# Patient Record
(demographics unavailable — no encounter records)

---

## 2024-12-11 NOTE — REVIEW OF SYSTEMS
[IPSS Score (0-40): ___] : IPSS score: [unfilled] [EPIC-CP Score (0-60): ___] : EPIC-CP score: [unfilled] [Urinary Urgency: Grade 2 - Limiting instrumental ADL; medical management indicated] : Urinary Urgency: Grade 2 - Limiting instrumental ADL; medical management indicated [Urinary Frequency: Grade 2 - Limiting instrumental ADL; medical management indicated] : Urinary Frequency: Grade 2 - Limiting instrumental ADL; medical management indicated [Erectile Dysfunction: Grade 2 - Decrease in erectile function (frequency/rigidity of erections), erectile intervention indicated, (e.g., medication or mechanical devices such as penile pump)] : Erectile Dysfunction: Grade 2 - Decrease in erectile function (frequency/rigidity of erections), erectile intervention indicated, (e.g., medication or mechanical devices such as penile pump) [Ejaculation Disorder: Grade 2 - Anejaculation or retrograde ejaculation] : Ejaculation Disorder: Grade 2 - Anejaculation or retrograde ejaculation

## 2024-12-12 NOTE — HISTORY OF PRESENT ILLNESS
[FreeTextEntry1] :   Merlyn Agrawal  is 65-year-old male with no family history of Prostate ca .He   8/2023 underwent Prostate Bx that revealed 10 out of 13 cores are positive, 10%-90% tissue involvement [place] Score 9 (4+5), a diagnosis of risk Prostatic Adenocarcinoma, He underwent on 12/18/2023 JAVAN procedure with pelvic lymph node dissection. pT3bN0 with high grade intraepithelial neoplasia  Urologist: Dr. Duran  PSA Trend: ng/mL 8/20/2024:   1.80ng/mL 5/7/2024:     0.13ng/mL 2/6/2024:     <0.01ng/mL 11/8/2023:    10.30ng/mL 7/11/2023:      8.01ng/mL  PATHOLOGY: 12/18/2023  pT3b ,  pN0 High grade prostatic intraepithelial  PET CT PSMA  11/21/2024  IMPRESSION:  Since PET/CT 9/15/2023, new 18F-DCFPyL avid sclerotic lesion in the right ischium suspicious for osseous metastasis.  Patient presents today for consideration of radiation therapy options to the prostate, referred by Dr. Duran. Overall, the patient states he feels well and denies any radiation therapy in the past. He notes baseline urine function with and nocturia x2-3 not using Flomax 0.4mg at night.  He does experience urinary urgency and frequency. He continues to have dribbling wears a pad and changes it three time daily.  Denies dysuria or hematuria. He has well-formed bowel movements. He denies blood or mucous in the stool. He denies rectal pain and states no history of hemorrhoids. He had his last colonoscopy was this past May 2024 with negative results. He has not received any form of ADT therapy in the past. He is not sexually active and on no ED medications.

## 2024-12-12 NOTE — HISTORY OF PRESENT ILLNESS
[FreeTextEntry1] :   Merlyn Agrawal  is 65-year-old male with no family history of Prostate ca .He   8/2023 underwent Prostate Bx that revealed 10 out of 13 cores are positive, 10%-90% tissue involvement [place] Score 9 (4+5), a diagnosis of risk Prostatic Adenocarcinoma, He underwent on 12/18/2023 JAVAN procedure with pelvic lymph node dissection. pT3bN0 with high grade intraepithelial neoplasia  Urologist: Dr. Duran  PSA Trend: ng/mL 8/20/2024:   1.80ng/mL 5/7/2024:     0.13ng/mL 2/6/2024:     <0.01ng/mL 11/8/2023:    10.30ng/mL 7/11/2023:      8.01ng/mL  PATHOLOGY: 12/18/2023  pT3b ,  pN0 High grade prostatic intraepithelial  PET CT PSMA  11/21/2024  IMPRESSION:  Since PET/CT 9/15/2023, new 18F-DCFPyL avid sclerotic lesion in the right ischium suspicious for osseous metastasis.  Patient presents today for consideration of radiation therapy options to the prostate, referred by Dr. Duran. Overall, the patient states he feels well and denies any radiation therapy in the past. He notes baseline urine function with and nocturia x2-3 not using Flomax 0.4mg at night.  He does experience urinary urgency and frequency. He continues to have dribbling wears a pad and changes it three time daily.  Denies dysuria or hematuria. He has well-formed bowel movements. He denies blood or mucous in the stool. He denies rectal pain and states no history of hemorrhoids. He had his last colonoscopy was this past May 2024 with negative results. He has not received any form of ADT therapy in the past. He is not sexually active and on no ED medications.

## 2024-12-12 NOTE — HISTORY OF PRESENT ILLNESS
[FreeTextEntry1] :   Merlyn Agrawal  is 65-year-old male with no family history of Prostate ca .He   8/2023 underwent Prostate Bx that revealed 10 out of 13 cores are positive, 10%-90% tissue involvement [place] Score 9 (4+5), a diagnosis of risk Prostatic Adenocarcinoma, He underwent on 12/18/2023 JAVAN procedure with pelvic lymph node dissection. pT3bN0 with high grade intraepithelial neoplasia  Urologist: Dr. Duran  PSA Trend: ng/mL 8/20/2024:   1.80ng/mL 5/7/2024:     0.13ng/mL 2/6/2024:     <0.01ng/mL 11/8/2023:    10.30ng/mL 7/11/2023:      8.01ng/mL  PATHOLOGY: 12/18/2023  pT3b ,  pN0 High grade prostatic intraepithelial  PET CT PSMA  11/21/2024  IMPRESSION:  Since PET/CT 9/15/2023, new 18F-DCFPyL avid sclerotic lesion in the right ischium suspicious for osseous metastasis.  Patient presents today for consideration of radiation therapy options to the prostate, referred by Dr. Duran. Overall, the patient states he feels well and denies any radiation therapy in the past. He notes baseline urine function with and nocturia x2-3 not using Flomax 0.4mg at night.  He does experience urinary urgency and frequency. He continues to have dribbling wears a pad and changes it three time daily.  Denies dysuria or hematuria. He has well-formed bowel movements. He denies blood or mucous in the stool. He denies rectal pain and states no history of hemorrhoids. He had his last colonoscopy was this past May 2024 with negative results. He has not received any form of ADT therapy in the past. He is not sexually active and on no ED medications.

## 2025-01-02 NOTE — DISEASE MANAGEMENT
[X] : MX [IV] : IV [Clinical] : TNM Stage: c [TTNM] : x [NTNM] : x [MTNM] : x [de-identified] : 2000cGy [de-identified] : 3000cGy [de-identified] : Prostate

## 2025-01-02 NOTE — HISTORY OF PRESENT ILLNESS
[FreeTextEntry1] :   Mr. BlackwellSien  is 65-year-old male with no family history of Prostate ca .He   8/2023 underwent Prostate Bx that revealed 10 out of 13 cores are positive, 10%-90% tissue involvement [place] Score 9 (4+5), a diagnosis of risk Prostatic Adenocarcinoma, He underwent on 12/18/2023 JAVAN procedure with pelvic lymph node dissection. pT3bN0 with high grade intraepithelial neoplasia. Following PSA which has been increasing . PET Scan revealed a new right ischium lesin and is here for radiation therpay  Urologist: Dr. Duran  PSA Trend: ng/mL 8/20/2024:   1.80ng/mL 5/7/2024:     0.13ng/mL 2/6/2024:     <0.01ng/mL 11/8/2023:    10.30ng/mL 7/11/2023:      8.01ng/mL  PATHOLOGY: 12/18/2023  pT3b ,  pN0 High grade prostatic intraepithelial  PET CT PSMA  11/21/2024  IMPRESSION:  Since PET/CT 9/15/2023, new 18F-DCFPyL avid sclerotic lesion in the right ischium suspicious for osseous metastasis.   OTV APPTS 12/30/2024: Mr. Blackwell has completed 2/3 Fx or 2000/3000cGy radiaiton to the right ischium. He denies any pain or discomfort. He has experiencing increase phlegm and fatigue and relating it to Casodex. Re enforced that he needs to continue to take the Casodex as prescribed. He verbalized his understanding via Cantonese  ID # [id number]

## 2025-01-02 NOTE — HISTORY OF PRESENT ILLNESS
[FreeTextEntry1] :   Mr. BlackwellSien  is 65-year-old male with no family history of Prostate ca .He   8/2023 underwent Prostate Bx that revealed 10 out of 13 cores are positive, 10%-90% tissue involvement [place] Score 9 (4+5), a diagnosis of risk Prostatic Adenocarcinoma, He underwent on 12/18/2023 JAVAN procedure with pelvic lymph node dissection. pT3bN0 with high grade intraepithelial neoplasia. Following PSA which has been increasing . PET Scan revealed a new right ischium lesin and is here for radiation therpay  Urologist: Dr. Duran  PSA Trend: ng/mL 8/20/2024:   1.80ng/mL 5/7/2024:     0.13ng/mL 2/6/2024:     <0.01ng/mL 11/8/2023:    10.30ng/mL 7/11/2023:      8.01ng/mL  PATHOLOGY: 12/18/2023  pT3b ,  pN0 High grade prostatic intraepithelial  PET CT PSMA  11/21/2024  IMPRESSION:  Since PET/CT 9/15/2023, new 18F-DCFPyL avid sclerotic lesion in the right ischium suspicious for osseous metastasis.   OTV APPTS 12/30/2024: Mr. Blackwell has completed 2/3 Fx or 2000/3000cGy radiaiton to the right ischium. He denies any pain or discomfort. He has experiencing increase phlegm and fatigue and relating it to Casodex. Re enforced that he needs to continue to take the Casodex as prescribed. He verbalized his understanding via Cantonese  ID # [id number]

## 2025-01-02 NOTE — DISEASE MANAGEMENT
[X] : MX [IV] : IV [Clinical] : TNM Stage: c [NTNM] : x [TTNM] : x [MTNM] : x [de-identified] : 2000cGy [de-identified] : 3000cGy [de-identified] : Prostate

## 2025-01-07 NOTE — LETTER BODY
[FreeTextEntry1] : June Sánchez MD  [address],  [place] [id number] [phone]    Dear Dr. Sánchez,    REASON FOR VISIT: Prostate cancer.  BPH  This is a 65 year -old gentleman with prostate cancer and BPH s/p prostate biopsy. His PET CT scan was negative. Prostate biopsy demonstrated evidence of [place] 9, 8, and 6 adenocarcinoma of the prostate involving 12 of 15 cores. The patient had JAVAN with Dr. Duran, with rising PSA after surgery. Patient then finished radiation therapy in December 2024. His current PSA is 1.8. The patient returns for Eligard injection. Since his last visit, he has been taking Bicalutamide for 2 weeks. He denies any interval complaints or difficulties. Patient reports no pain. He has been doing well. Patient denies any changes in health. The past medical history and family history and social history are unchanged. All other review of systems are negative. Patient denies any changes in medications. Medication list was reconciled.    He is currently taking calcium supplements and vitamin D for osteoporosis.     On examination, the patient is a healthy-appearing gentleman in no acute distress. He is alert and oriented follows commands. He has normal mood and affect. He is normocephalic. Oral no thrush. Neck is supple. Respirations are unlabored. His abdomen is soft and nontender. Liver is nonpalpable. Bladder is nonpalpable. No CVA tenderness. Neurologically he is grossly intact. No peripheral edema. Skin without gross abnormality.   The patient's right lower abdomen was cleaned with alcohol, 45 mg Eligard was applied subcutaneously. Patient tolerated procedure well.    Assessment: Prostate cancer on the androgen ablation.  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    I counseled the patient. I encourage patient to continue with Eligard as LHRH agonist will help suppress his prostate cancer. He will obtain PSA and testosterone to monitor efficacy of treatment. Risks and alternatives were discussed. I answered the patient questions. The patient will follow-up as directed and will contact me with any questions or concerns. He will return in six months time for Eligard injection. Thank you for the opportunity to participate in the care of this patient. I'll keep you updated on his progress.    Plan: PSA. Testosterone. Eligard in 6 months.  I spent 30-minutes time today on all issues related to this patient on today date of service including non face to face time.

## 2025-01-07 NOTE — ADDENDUM
[FreeTextEntry1] : Entered by Jin Green, acting as scribe for Dr. Gutierrez Pina. The documentation recorded by the scribe accurately reflects the service I personally performed and the decisions made by me.

## 2025-02-27 NOTE — HISTORY OF PRESENT ILLNESS
[FreeTextEntry1] :  Spouse Translated  Merlyn Agrawal is 65-year-old male with no family history of Prostate ca .He 8/2023 underwent Prostate Bx that revealed 10 out of 13 cores are positive, 10%-90% tissue involvement [place] Score 9 (4+5), a diagnosis of risk Prostatic Adenocarcinoma, He underwent on 12/18/2023 JAVAN procedure with pelvic lymph node dissection. pT3bN0 with high grade intraepithelial neoplasia. Following PSA which has been increasing . PET Scan revealed a new right ischium lesion and completed on 1/3/2025 SBRT 3000cGy radiation to the Right ischium lesion. He tolerated RT well without developing any Grade 3 or higher acute toxicity as a result of his treatment and the KPS remained stable during the course of radiation treatment. He currently receiving ADT therapy ( last 6 month injection 1/2025 ).  Urologist: Dr. Duran  PSA Trend: ng/mL  2/20/2025:   <0.01ng/mL 8/20/2024:   1.80ng/mL 5/7/2024:     0.13ng/mL 2/6/2024:     <0.01ng/mL 11/8/2023:    10.30ng/mL 7/11/2023:      8.01ng/mL  PATHOLOGY: 12/18/2023  pT3b ,  pN0 High grade prostatic intraepithelial  PET CT PSMA  11/21/2024  IMPRESSION:  Since PET/CT 9/15/2023, new 18F-DCFPyL avid sclerotic lesion in the right ischium suspicious for osseous metastasis.  Mr. Blackwell presents today for post treatment evaluation.  He has no urinary urgency or frequency, dysuria or hematuria.  Nocturia X2 and on no Flomax. Bowel movements are normal. He is not experiencing hot flashes, night sweats or fatigue.. He is not sexually active.
[FreeTextEntry1] :  Spouse Translated  Merlyn Agrawal is 65-year-old male with no family history of Prostate ca .He 8/2023 underwent Prostate Bx that revealed 10 out of 13 cores are positive, 10%-90% tissue involvement [place] Score 9 (4+5), a diagnosis of risk Prostatic Adenocarcinoma, He underwent on 12/18/2023 JAVAN procedure with pelvic lymph node dissection. pT3bN0 with high grade intraepithelial neoplasia. Following PSA which has been increasing . PET Scan revealed a new right ischium lesion and completed on 1/3/2025 SBRT 3000cGy radiation to the Right ischium lesion. He tolerated RT well without developing any Grade 3 or higher acute toxicity as a result of his treatment and the KPS remained stable during the course of radiation treatment. He currently receiving ADT therapy ( last 6 month injection 1/2025 ).  Urologist: Dr. Duran  PSA Trend: ng/mL  2/20/2025:   <0.01ng/mL 8/20/2024:   1.80ng/mL 5/7/2024:     0.13ng/mL 2/6/2024:     <0.01ng/mL 11/8/2023:    10.30ng/mL 7/11/2023:      8.01ng/mL  PATHOLOGY: 12/18/2023  pT3b ,  pN0 High grade prostatic intraepithelial  PET CT PSMA  11/21/2024  IMPRESSION:  Since PET/CT 9/15/2023, new 18F-DCFPyL avid sclerotic lesion in the right ischium suspicious for osseous metastasis.  Mr. Blackwell presents today for post treatment evaluation.  He has no urinary urgency or frequency, dysuria or hematuria.  Nocturia X2 and on no Flomax. Bowel movements are normal. He is not experiencing hot flashes, night sweats or fatigue.. He is not sexually active.
[FreeTextEntry1] :  Spouse Translated  Merlyn Agrawal is 65-year-old male with no family history of Prostate ca .He 8/2023 underwent Prostate Bx that revealed 10 out of 13 cores are positive, 10%-90% tissue involvement [place] Score 9 (4+5), a diagnosis of risk Prostatic Adenocarcinoma, He underwent on 12/18/2023 JAAVN procedure with pelvic lymph node dissection. pT3bN0 with high grade intraepithelial neoplasia. Following PSA which has been increasing . PET Scan revealed a new right ischium lesion and completed on 1/3/2025 SBRT 3000cGy radiation to the Right ischium lesion. He tolerated RT well without developing any Grade 3 or higher acute toxicity as a result of his treatment and the KPS remained stable during the course of radiation treatment. He currently receiving ADT therapy ( last 6 month injection 1/2025 ).  Urologist: Dr. Duran  PSA Trend: ng/mL  2/20/2025:   <0.01ng/mL 8/20/2024:   1.80ng/mL 5/7/2024:     0.13ng/mL 2/6/2024:     <0.01ng/mL 11/8/2023:    10.30ng/mL 7/11/2023:      8.01ng/mL  PATHOLOGY: 12/18/2023  pT3b ,  pN0 High grade prostatic intraepithelial  PET CT PSMA  11/21/2024  IMPRESSION:  Since PET/CT 9/15/2023, new 18F-DCFPyL avid sclerotic lesion in the right ischium suspicious for osseous metastasis.  Mr. Blackwell presents today for post treatment evaluation.  He has no urinary urgency or frequency, dysuria or hematuria.  Nocturia X2 and on no Flomax. Bowel movements are normal. He is not experiencing hot flashes, night sweats or fatigue.. He is not sexually active.
Post birth warning s/s handout given and reviewed with pt with verbalization of understanding

## 2025-02-27 NOTE — REVIEW OF SYSTEMS
[IPSS Score (0-40): ___] : IPSS score: [unfilled] [EPIC-CP Score (0-60): ___] : EPIC-CP score: [unfilled] [Negative] : Allergic/Immunologic [Anal Pain: Grade 0] : Anal Pain: Grade 0 [Constipation: Grade 0] : Constipation: Grade 0 [Diarrhea: Grade 0] : Diarrhea: Grade 0 [Dyspepsia: Grade 0] : Dyspepsia: Grade 0 [Dysphagia: Grade 0] : Dysphagia: Grade 0 [Esophagitis: Grade 0] : Esophagitis: Grade 0 [Fecal Incontinence: Grade 0] : Fecal Incontinence: Grade 0 [Gastroparesis: Grade 0] : Gastroparesis: Grade 0 [Nausea: Grade 0] : Nausea: Grade 0 [Proctitis: Grade 0] : Proctitis: Grade 0 [Rectal Pain: Grade 0] : Rectal Pain: Grade 0 [Small Intestinal Obstruction: Grade 0] : Small Intestinal Obstruction: Grade 0 [Vomiting: Grade 0] : Vomiting: Grade 0 [Hematuria: Grade 0] : Hematuria: Grade 0 [Urinary Incontinence: Grade 0] : Urinary Incontinence: Grade 0  [Urinary Retention: Grade 0] : Urinary Retention: Grade 0 [Urinary Tract Pain: Grade 0] : Urinary Tract Pain: Grade 0 [Urinary Urgency: Grade 0] : Urinary Urgency: Grade 0 [Urinary Frequency: Grade 0] : Urinary Frequency: Grade 0 [Erectile Dysfunction: Grade 2 - Decrease in erectile function (frequency/rigidity of erections), erectile intervention indicated, (e.g., medication or mechanical devices such as penile pump)] : Erectile Dysfunction: Grade 2 - Decrease in erectile function (frequency/rigidity of erections), erectile intervention indicated, (e.g., medication or mechanical devices such as penile pump) [Ejaculation Disorder: Grade 2 - Anejaculation or retrograde ejaculation] : Ejaculation Disorder: Grade 2 - Anejaculation or retrograde ejaculation [Nocturia] : no nocturia [Urinary Frequency] : no urinary frequency

## 2025-07-08 NOTE — ADDENDUM
[FreeTextEntry1] : Entered by Fanny Power, acting as scribe for Dr Gutierrez Pina. The documentation recorded by the scribe accurately reflects the service I personally performed and the decisions made by me.

## 2025-07-08 NOTE — LETTER BODY
[FreeTextEntry1] : June Sánchez MD  [address],  [place] [id number] [phone]    Dear Dr. Sánchez,    REASON FOR VISIT: Prostate cancer. BPH  This is a 65 year -old gentleman with prostate cancer and BPH s/p prostate biopsy. His PET CT scan was negative. Prostate biopsy demonstrated evidence of [place] 9, 8, and 6 adenocarcinoma of the prostate involving 12 of 15 cores. The patient had JAVAN with Dr. Duran, with rising PSA after surgery. Patient then finished radiation therapy in December 2024. He was first given Eligard in January 2025. He returns today for follow up and his second Eligard shot. His PSA was undetectable in February 2025. He complains of new stress incontinence since prostectomy and is interested in medical intervention. He denies any other interval complaints or difficulties. Patient reports no pain. He has been doing well. Patient denies any changes in health. The past medical history and family history and social history are unchanged. All other review of systems are negative. Patient denies any changes in medications. Medication list was reconciled.   He is currently taking calcium supplements and vitamin D for osteoporosis.    On examination, the patient is a healthy-appearing gentleman in no acute distress. He is alert and oriented follows commands. He has normal mood and affect. He is normocephalic. Oral no thrush. Neck is supple. Respirations are unlabored. His abdomen is soft and nontender. Liver is nonpalpable. Bladder is nonpalpable. No CVA tenderness. Neurologically he is grossly intact. No peripheral edema. Skin without gross abnormality.  The patient's right lower abdomen was cleaned with alcohol, 45 mg Eligard was applied subcutaneously. Patient tolerated procedure well.   Assessment: Prostate cancer on the androgen ablation. 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 Stress incontinence.     I counseled the patient. I encourage patient to continue with Eligard as LHRH agonist will help suppress his prostate cancer. His PSA was undetectable in February 2025. He will obtain PSA and testosterone to monitor efficacy of treatment. In terms of his new stress incontinence, the patient is interested in medical therapy. I recommended that he begin a course of Trospium. I discussed the potential side effects of the medication. I counseled the patient on its use and side effects. If the patient develops any side effects, the patient will discontinue the medication and contact me.  Risks and alternatives were discussed. I answered the patient questions. The patient will follow-up as directed and will contact me with any questions or concerns. He will return in six months time for Eligard injection. Thank you for the opportunity to participate in the care of this patient. I'll keep you updated on his progress.    Plan: Trial of Trospium. PSA. Testosterone. Eligard in 6 months.  I spent 30-minutes time today on all issues related to this patient on today date of service including non face to face time.

## 2025-07-08 NOTE — HISTORY OF PRESENT ILLNESS
[FreeTextEntry1] : F/U for prostate cancer, s/p prostectomy with  in Dec 2023, raising PSA after surgery, s/p radiation in Dec 2024. Eligard #1 was given in Jan 2025, return to receive second dose of Eligard. PSA was undetectable in Feb 2025 New stress incontinent after prostectomy, asking if he can take medication to control it.  Please refer to [facility] Consult note